# Patient Record
Sex: FEMALE | Race: WHITE | NOT HISPANIC OR LATINO | Employment: FULL TIME | URBAN - METROPOLITAN AREA
[De-identification: names, ages, dates, MRNs, and addresses within clinical notes are randomized per-mention and may not be internally consistent; named-entity substitution may affect disease eponyms.]

---

## 2017-05-23 ENCOUNTER — OFFICE VISIT (OUTPATIENT)
Dept: FAMILY MEDICINE | Facility: CLINIC | Age: 57
End: 2017-05-23
Payer: COMMERCIAL

## 2017-05-23 VITALS
HEIGHT: 61 IN | TEMPERATURE: 98 F | RESPIRATION RATE: 16 BRPM | WEIGHT: 110.44 LBS | BODY MASS INDEX: 20.85 KG/M2 | OXYGEN SATURATION: 97 % | DIASTOLIC BLOOD PRESSURE: 94 MMHG | HEART RATE: 75 BPM | SYSTOLIC BLOOD PRESSURE: 128 MMHG

## 2017-05-23 DIAGNOSIS — M81.0 OSTEOPOROSIS, UNSPECIFIED OSTEOPOROSIS TYPE, UNSPECIFIED PATHOLOGICAL FRACTURE PRESENCE: ICD-10-CM

## 2017-05-23 DIAGNOSIS — I10 HYPERTENSION, UNCONTROLLED: Primary | ICD-10-CM

## 2017-05-23 DIAGNOSIS — Z86.69 H/O MIGRAINE: ICD-10-CM

## 2017-05-23 DIAGNOSIS — Z85.3 HISTORY OF BREAST CANCER: ICD-10-CM

## 2017-05-23 PROCEDURE — 1160F RVW MEDS BY RX/DR IN RCRD: CPT | Mod: S$GLB,,, | Performed by: FAMILY MEDICINE

## 2017-05-23 PROCEDURE — 99214 OFFICE O/P EST MOD 30 MIN: CPT | Mod: S$GLB,,, | Performed by: FAMILY MEDICINE

## 2017-05-23 PROCEDURE — 3080F DIAST BP >= 90 MM HG: CPT | Mod: S$GLB,,, | Performed by: FAMILY MEDICINE

## 2017-05-23 PROCEDURE — 3074F SYST BP LT 130 MM HG: CPT | Mod: S$GLB,,, | Performed by: FAMILY MEDICINE

## 2017-05-23 PROCEDURE — 99999 PR PBB SHADOW E&M-NEW PATIENT-LVL III: CPT | Mod: PBBFAC,,, | Performed by: FAMILY MEDICINE

## 2017-05-23 RX ORDER — SUMATRIPTAN 50 MG/1
50 TABLET, FILM COATED ORAL
COMMUNITY

## 2017-05-23 RX ORDER — IBANDRONATE SODIUM 150 MG/1
150 TABLET, FILM COATED ORAL
Refills: 10 | COMMUNITY
Start: 2017-03-20 | End: 2018-04-26 | Stop reason: SDUPTHER

## 2017-05-23 RX ORDER — MAGNESIUM 200 MG
TABLET ORAL 2 TIMES DAILY
COMMUNITY
End: 2017-07-28

## 2017-05-23 RX ORDER — LISINOPRIL 20 MG/1
20 TABLET ORAL DAILY
Refills: 0 | COMMUNITY
Start: 2017-03-20 | End: 2017-05-23

## 2017-05-23 RX ORDER — AMLODIPINE BESYLATE 5 MG/1
5 TABLET ORAL DAILY
Qty: 90 TABLET | Refills: 2 | Status: SHIPPED | OUTPATIENT
Start: 2017-05-23 | End: 2017-07-28

## 2017-05-23 RX ORDER — HYDROCHLOROTHIAZIDE 25 MG/1
50 TABLET ORAL DAILY
Refills: 0 | COMMUNITY
Start: 2017-03-20 | End: 2017-05-23

## 2017-05-23 NOTE — PROGRESS NOTES
"Subjective:       Patient ID: Kandace Mcdonald is a 57 y.o. female.    Chief Complaint: Hypertension (follow up )    HPI    Htn, uncontrolled - Pt with a h/o elevated blood pressure. She was placed on lisinopril (which gave her a cough and hctz which "made her arms feel heavy" so she is not currently on any medications for this. Her job requires adequate BP control.     Osteoporosis - Chronic - pt diagnosed in the last 5 years, and is on boniva. She is also participating in strength training exercise at least BID to help strengthen her bones.     H/o breast cancer - pt was treated with surgery and radiation, and just finished 5 years of estrogen receptor blocker therapy. She has missed her last follow -up with H[place]. She is askign if she should reschedule that follow up appointment.     H/o migraines - pt is on imitrex prn.     No current outpatient prescriptions on file prior to visit.     No current facility-administered medications on file prior to visit.        Past Medical History:   Diagnosis Date    Allergy     Breast cancer     Cancer     breast    Hypertension     Invasive ductal carcinoma of breast     Osteoporosis        Family History   Problem Relation Age of Onset    Heart disease Mother     COPD Mother     Osteoporosis Mother     Heart disease Father     No Known Problems Sister     Hypothyroidism Daughter     No Known Problems Son     No Known Problems Maternal Grandmother     No Known Problems Maternal Grandfather     No Known Problems Paternal Grandmother     No Known Problems Paternal Grandfather     Breast cancer Sister         reports that she has never smoked. She does not have any smokeless tobacco history on file. She reports that she drinks alcohol. She reports that she does not use drugs.    Review of Systems   Constitutional: Negative for chills and fever.   HENT: Negative for congestion, ear pain, hearing loss, rhinorrhea and sore throat.    Eyes: Negative for pain " and discharge.   Respiratory: Negative for cough and shortness of breath.    Cardiovascular: Negative for chest pain and palpitations.   Gastrointestinal: Negative for diarrhea, nausea and vomiting.   Genitourinary: Negative for difficulty urinating, dysuria and frequency.   Allergic/Immunologic: Negative for environmental allergies and food allergies.   Neurological: Negative for seizures and weakness.   Psychiatric/Behavioral: Negative for dysphoric mood. The patient is not nervous/anxious.        Objective:     Vitals:    05/23/17 1603   BP: (!) 128/94   Pulse: 75   Resp: 16   Temp: 98.4 °F (36.9 °C)        Physical Exam   Constitutional: She appears well-developed. No distress.   HENT:   Head: Normocephalic and atraumatic.   Eyes: Conjunctivae are normal. No scleral icterus.   Pulmonary/Chest: Effort normal.   Musculoskeletal: She exhibits no deformity.   Neurological: She is alert.   Skin: Skin is warm and dry. No rash noted. She is not diaphoretic.   Psychiatric: She has a normal mood and affect. Her behavior is normal.   Vitals reviewed.      Assessment:       1. Hypertension, uncontrolled    2. Osteoporosis, unspecified osteoporosis type, unspecified pathological fracture presence    3. History of breast cancer    4. H/O migraine        Plan:       Kandace was seen today for hypertension.    Diagnoses and all orders for this visit:    Hypertension, uncontrolled  -     amlodipine (NORVASC) 5 MG tablet; Take 1 tablet (5 mg total) by mouth once daily.  Pts blood pressure is uncontrolled. I advised the following lifestyle changes:  - exercise for 20 mins x 3-5 a week per AHA recommendations  - weight reduction if overweight by calorie restriction  - increase consumption of fruit/vegetables to 5 or more servings a day        - reduce sodium intake    At this stage, we discussed that their risk for MI and CVA is too high with their current BP, and will add amlo 5mg.          Pt asked to keep BP log with date/BP,  taking BP at least 4 x a week. They will bring this with them to their next appointment.     Pt asked to call me if BPs consistently above 140/90.    Pts questions were answered.    The CVD Risk score (CHRISTY'Agostino, et al., 2008) failed to calculate for the following reasons:    Cannot find a previous HDL lab    Cannot find a previous total cholesterol lab    Osteoporosis, unspecified osteoporosis type, unspecified pathological fracture presence  - Chronic - stable     Pt is doing well on current therapy and is requesting a refill. No side effects noted. Will continue current therapy.         History of breast cancer  Pt asked to f/u with h/o and ask if routine observation can be taken over by primary care.     H/O migraine  - Chronic - stable     Pt is doing well on current therapy. No side effects noted. Will continue current therapy.            Return in about 4 weeks (around 6/20/2017) for Hypertension.      Pt verbalized understanding and agreed with our plan.

## 2017-05-23 NOTE — MEDICAL/APP STUDENT
Subjective:       Patient ID: Kandace Mcdonald is a 57 y.o. female.    Chief Complaint: Hypertension (follow up )    HPI     Referred to establish care with new PCP. 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     1. HTN - pt reports high BP found at recent physical for new job. She says she has never had HTN before. She was started on HCTZ and Lisinopril but stopped taking after 3 days due to muscle aches and lethargy.    2. Osteoperosis - existing problem from previous provider in [place]. She explained her last lab results showed signs of osteoperosis and was placed on Boniva. No associated muscle or bone pain.      Review of Systems   Constitutional: Negative for chills, fatigue and fever.   HENT: Negative for congestion, ear pain, rhinorrhea and sore throat.    Eyes: Negative.    Respiratory: Negative for cough and shortness of breath.    Cardiovascular: Negative for chest pain.   Gastrointestinal: Negative for abdominal pain, constipation, diarrhea, nausea and vomiting.   Genitourinary: Negative for dysuria, frequency and hematuria.   Musculoskeletal: Negative for arthralgias, myalgias and neck stiffness.   Neurological: Negative.    Psychiatric/Behavioral: Negative.        Objective:      Physical Exam   Constitutional: She is oriented to person, place, and time. She appears well-developed and well-nourished.   HENT:   Head: Normocephalic and atraumatic.   Eyes: EOM are normal. Pupils are equal, round, and reactive to light.   Neck: Normal range of motion. Neck supple.   Cardiovascular: Normal rate, regular rhythm, normal heart sounds and intact distal pulses.    Pulmonary/Chest: Effort normal and breath sounds normal.   Abdominal: Soft. Bowel sounds are normal.   Musculoskeletal: Normal range of motion.   Neurological: She is alert and oriented to person, place, and time.   Psychiatric: She has a normal mood and affect. Her behavior is normal. Judgment and thought content normal.        Assessment:       No diagnosis found.    Plan:       1. HTN - started on 5mg amlodipine. Follow- up 1 month

## 2017-06-23 ENCOUNTER — OFFICE VISIT (OUTPATIENT)
Dept: FAMILY MEDICINE | Facility: CLINIC | Age: 57
End: 2017-06-23
Payer: COMMERCIAL

## 2017-06-23 VITALS
BODY MASS INDEX: 20.65 KG/M2 | SYSTOLIC BLOOD PRESSURE: 110 MMHG | OXYGEN SATURATION: 97 % | TEMPERATURE: 98 F | HEART RATE: 78 BPM | DIASTOLIC BLOOD PRESSURE: 80 MMHG | HEIGHT: 61 IN | RESPIRATION RATE: 16 BRPM | WEIGHT: 109.38 LBS

## 2017-06-23 DIAGNOSIS — I10 HYPERTENSION, UNCONTROLLED: Primary | ICD-10-CM

## 2017-06-23 DIAGNOSIS — Z13.220 SCREENING FOR CHOLESTEROL LEVEL: ICD-10-CM

## 2017-06-23 DIAGNOSIS — E03.8 SUBCLINICAL HYPOTHYROIDISM: ICD-10-CM

## 2017-06-23 PROCEDURE — 99999 PR PBB SHADOW E&M-EST. PATIENT-LVL III: CPT | Mod: PBBFAC,,, | Performed by: FAMILY MEDICINE

## 2017-06-23 PROCEDURE — 99214 OFFICE O/P EST MOD 30 MIN: CPT | Mod: S$GLB,,, | Performed by: FAMILY MEDICINE

## 2017-06-23 NOTE — PROGRESS NOTES
Subjective:       Patient ID: Kandace Mcdonald is a 57 y.o. female.    Chief Complaint: Hypertension (follow up )    HPI    HTN F/u:    Adherence with meds? Yes  Home BP range: 106-139/ Majority around 120/85  Side effects: No  Following a low salt diet ? Yes  Current exercise? Yes  Need of refills? No  Recent changes in blood pressure medication: Yes added amlo 5mg  Patient has been in pain or taking decongestants/anti-inflammatory/OCP/SSRI medication: no  Patient has other symptoms (headache, weakness,  blurry vision, chest pain, palpitations, etc): No    Sublcincial hypothyroid - pts was diagnosed with low thyroid 1+ year ago, but was not placed on medication, but was instead asked to start a supplement for the thyroid called '[place] thyroid'. She takes this every night and states that his helps her sleep.     HLD - pt states that her bp is normally 'borderline'    Current Outpatient Prescriptions on File Prior to Visit   Medication Sig Dispense Refill    amlodipine (NORVASC) 5 MG tablet Take 1 tablet (5 mg total) by mouth once daily. 90 tablet 2    CHOLECALCIFEROL, VITAMIN D3, (D3-2000 ORAL) Take by mouth 2 (two) times daily.      GLUCOSAMINE SULFATE (GLUCOSAMINE ORAL) Take by mouth 2 (two) times daily.      ibandronate (BONIVA) 150 mg tablet Take 150 mg by mouth every 30 days.  10    magnesium 200 mg Tab Take by mouth 2 (two) times daily.      MULTIVITAMIN ORAL Take by mouth once daily at 6am.      sumatriptan (IMITREX) 50 MG tablet Take 50 mg by mouth as needed for Migraine.      THYROID ORAL Take by mouth once daily at 6am.       No current facility-administered medications on file prior to visit.        Past Medical History:   Diagnosis Date    Allergy     Breast cancer     Cancer     breast    Hypertension     Invasive ductal carcinoma of breast     Osteoporosis        Family History   Problem Relation Age of Onset    Heart disease Mother     COPD Mother     Osteoporosis Mother      Heart disease Father     No Known Problems Sister     Hypothyroidism Daughter     No Known Problems Son     No Known Problems Maternal Grandmother     No Known Problems Maternal Grandfather     No Known Problems Paternal Grandmother     No Known Problems Paternal Grandfather     Breast cancer Sister         reports that she has never smoked. She does not have any smokeless tobacco history on file. She reports that she drinks alcohol. She reports that she does not use drugs.    Review of Systems  see hpi  Objective:     Vitals:    06/23/17 1610   BP: 110/80   Pulse: 78   Resp: 16   Temp: 98.4 °F (36.9 °C)        Physical Exam   Constitutional: She appears well-developed. No distress.   HENT:   Head: Normocephalic and atraumatic.   Eyes: Conjunctivae are normal. Right eye exhibits no discharge. Left eye exhibits no discharge. No scleral icterus.   Cardiovascular: Normal rate, regular rhythm and normal heart sounds.  Exam reveals no gallop and no friction rub.    No murmur heard.  Pulmonary/Chest: Effort normal and breath sounds normal. No respiratory distress. She has no wheezes. She has no rales.   Musculoskeletal: She exhibits no edema.   Neurological: She is alert.   Skin: She is not diaphoretic.   Psychiatric: She has a normal mood and affect.   Vitals reviewed.      Assessment:       1. Hypertension, uncontrolled    2. Subclinical hypothyroidism    3. Screening for cholesterol level        Plan:       Kandace was seen today for hypertension.    Diagnoses and all orders for this visit:    Hypertension, uncontrolled  Pts blood pressure is uncontrolled.          Pt asked to keep BP log with date/BP, taking BP at least 4 x a week. They will bring this with them to their next appointment.     Pt asked to call me if BPs consistently above 140/90.     Pts questions were answered.    The CVD Risk score (D'Agostino, et al., 2008) failed to calculate for the following reasons:    Cannot find a previous HDL lab     Cannot find a previous total cholesterol lab      Subclinical hypothyroidism - NEW  -     TSH; Future  -     Comprehensive metabolic panel; Future  Last tsh was > 4  Will check again.   I am uncertain what her thyroid supplement contains. Will reval at the next visit after labs are resulted.       Screening for cholesterol level  -     Lipid panel; Future            Return in about 4 weeks (around 7/21/2017) for Hypertension.        Pt verbalized understanding and agreed with our plan.

## 2017-07-22 ENCOUNTER — LAB VISIT (OUTPATIENT)
Dept: LAB | Facility: HOSPITAL | Age: 57
End: 2017-07-22
Attending: FAMILY MEDICINE
Payer: COMMERCIAL

## 2017-07-22 DIAGNOSIS — Z13.220 SCREENING FOR CHOLESTEROL LEVEL: ICD-10-CM

## 2017-07-22 DIAGNOSIS — E03.8 SUBCLINICAL HYPOTHYROIDISM: ICD-10-CM

## 2017-07-22 LAB
ALBUMIN SERPL BCP-MCNC: 4 G/DL
ALP SERPL-CCNC: 89 U/L
ALT SERPL W/O P-5'-P-CCNC: 20 U/L
ANION GAP SERPL CALC-SCNC: 9 MMOL/L
AST SERPL-CCNC: 22 U/L
BILIRUB SERPL-MCNC: 1.1 MG/DL
BUN SERPL-MCNC: 17 MG/DL
CALCIUM SERPL-MCNC: 9.9 MG/DL
CHLORIDE SERPL-SCNC: 107 MMOL/L
CHOLEST/HDLC SERPL: 4.5 {RATIO}
CO2 SERPL-SCNC: 24 MMOL/L
CREAT SERPL-MCNC: 0.9 MG/DL
EST. GFR  (AFRICAN AMERICAN): >60 ML/MIN/1.73 M^2
EST. GFR  (NON AFRICAN AMERICAN): >60 ML/MIN/1.73 M^2
GLUCOSE SERPL-MCNC: 91 MG/DL
HDL/CHOLESTEROL RATIO: 22.2 %
HDLC SERPL-MCNC: 243 MG/DL
HDLC SERPL-MCNC: 54 MG/DL
LDLC SERPL CALC-MCNC: 164.8 MG/DL
NONHDLC SERPL-MCNC: 189 MG/DL
POTASSIUM SERPL-SCNC: 4.3 MMOL/L
PROT SERPL-MCNC: 7.3 G/DL
SODIUM SERPL-SCNC: 140 MMOL/L
TRIGL SERPL-MCNC: 121 MG/DL
TSH SERPL DL<=0.005 MIU/L-ACNC: 3.42 UIU/ML

## 2017-07-22 PROCEDURE — 80061 LIPID PANEL: CPT

## 2017-07-22 PROCEDURE — 36415 COLL VENOUS BLD VENIPUNCTURE: CPT | Mod: PO

## 2017-07-22 PROCEDURE — 80053 COMPREHEN METABOLIC PANEL: CPT

## 2017-07-22 PROCEDURE — 84443 ASSAY THYROID STIM HORMONE: CPT

## 2017-07-28 ENCOUNTER — OFFICE VISIT (OUTPATIENT)
Dept: FAMILY MEDICINE | Facility: CLINIC | Age: 57
End: 2017-07-28
Payer: COMMERCIAL

## 2017-07-28 VITALS
HEIGHT: 61 IN | WEIGHT: 109.38 LBS | HEART RATE: 71 BPM | SYSTOLIC BLOOD PRESSURE: 118 MMHG | OXYGEN SATURATION: 97 % | BODY MASS INDEX: 20.65 KG/M2 | TEMPERATURE: 98 F | DIASTOLIC BLOOD PRESSURE: 80 MMHG

## 2017-07-28 DIAGNOSIS — R10.9 FUNCTIONAL ABDOMINAL PAIN SYNDROME: ICD-10-CM

## 2017-07-28 DIAGNOSIS — I10 HYPERTENSION, UNCONTROLLED: Primary | ICD-10-CM

## 2017-07-28 PROCEDURE — 99214 OFFICE O/P EST MOD 30 MIN: CPT | Mod: S$GLB,,, | Performed by: FAMILY MEDICINE

## 2017-07-28 PROCEDURE — 99999 PR PBB SHADOW E&M-EST. PATIENT-LVL III: CPT | Mod: PBBFAC,,, | Performed by: FAMILY MEDICINE

## 2017-07-28 RX ORDER — AMLODIPINE BESYLATE 10 MG/1
10 TABLET ORAL DAILY
Qty: 90 TABLET | Refills: 2 | Status: SHIPPED | OUTPATIENT
Start: 2017-07-28 | End: 2017-10-27

## 2017-07-28 NOTE — PROGRESS NOTES
Subjective:       Patient ID: Kandace Mcdonald is a 57 y.o. female.    Chief Complaint: 1 Month follow up/ Results    HPI    HTN F/u:    Adherence with meds? No  Home BP range: running a little high - 90s diastolic.   Side effects: No  Following a low salt diet ? No  Current exercise? Yes  Need of refills? No  Recent changes in blood pressure medication: No  Patient has been in pain or taking decongestants/anti-inflammatory/OCP/SSRI medication: no  Patient has other symptoms (headache, weakness,  blurry vision, chest pain, palpitations, etc): no    Upset stomach (mild abd pain) x 3 weeks that occurs mostly to the morning, but has occurred during the afternoon after she eats. Her biggest meal is at night and she does not eat breakfast often. Her stools lately have been loose, and her bms are the morning, and her pain goes away after she has a bowel movement.     + extra stress lately.         Current Outpatient Prescriptions on File Prior to Visit   Medication Sig Dispense Refill    CHOLECALCIFEROL, VITAMIN D3, (D3-2000 ORAL) Take by mouth 2 (two) times daily.      GLUCOSAMINE SULFATE (GLUCOSAMINE ORAL) Take by mouth 2 (two) times daily.      ibandronate (BONIVA) 150 mg tablet Take 150 mg by mouth every 30 days.  10    MULTIVITAMIN ORAL Take by mouth once daily at 6am.      sumatriptan (IMITREX) 50 MG tablet Take 50 mg by mouth as needed for Migraine.      THYROID ORAL Take by mouth once daily at 6am.      [DISCONTINUED] amlodipine (NORVASC) 5 MG tablet Take 1 tablet (5 mg total) by mouth once daily. 90 tablet 2    [DISCONTINUED] magnesium 200 mg Tab Take by mouth 2 (two) times daily.       No current facility-administered medications on file prior to visit.        Past Medical History:   Diagnosis Date    Allergy     Breast cancer     Cancer     breast    Hypertension     Invasive ductal carcinoma of breast     Osteoporosis        Family History   Problem Relation Age of Onset    Heart disease Mother      COPD Mother     Osteoporosis Mother     Heart disease Father     No Known Problems Sister     Hypothyroidism Daughter     No Known Problems Son     No Known Problems Maternal Grandmother     No Known Problems Maternal Grandfather     No Known Problems Paternal Grandmother     No Known Problems Paternal Grandfather     Breast cancer Sister         reports that she has never smoked. She does not have any smokeless tobacco history on file. She reports that she drinks alcohol. She reports that she does not use drugs.    Review of Systems  see hpi  Objective:     Vitals:    07/28/17 1554   BP: 118/80   Pulse: 71   Temp: 98.2 °F (36.8 °C)        Physical Exam   Constitutional: She appears well-developed. No distress.   HENT:   Head: Normocephalic and atraumatic.   Eyes: Conjunctivae are normal. Right eye exhibits no discharge. Left eye exhibits no discharge. No scleral icterus.   Cardiovascular: Normal rate, regular rhythm and normal heart sounds.  Exam reveals no gallop and no friction rub.    No murmur heard.  Pulmonary/Chest: Effort normal and breath sounds normal. No respiratory distress. She has no wheezes. She has no rales.   Abdominal: Soft. Bowel sounds are normal. She exhibits no distension. There is no tenderness. There is no guarding.   Neurological: She is alert.   Skin: She is not diaphoretic.   Psychiatric: She has a normal mood and affect.   Vitals reviewed.      Assessment:       1. Hypertension, uncontrolled    2. Functional abdominal pain syndrome        Plan:       Kandace was seen today for 1 month follow up/ results.    Diagnoses and all orders for this visit:    Hypertension, uncontrolled  -     amlodipine (NORVASC) 10 MG tablet; Take 1 tablet (10 mg total) by mouth once daily.  suboptimal control --> amlo 10.  F/u in 3 months or sooner if still above goal of 140/90.     Functional abdominal pain syndrome  Diff includes FAP vs bacterial overgrowth. Will try Align.           Return in  about 3 months (around 10/28/2017) for Hypertension, abd pain.        Pt verbalized understanding and agreed with our plan.

## 2017-08-02 ENCOUNTER — TELEPHONE (OUTPATIENT)
Dept: FAMILY MEDICINE | Facility: CLINIC | Age: 57
End: 2017-08-02

## 2017-08-02 NOTE — TELEPHONE ENCOUNTER
----- Message from Sandeep Grady MD sent at 7/25/2017  2:10 PM CDT -----  Results were all OK except for you cholesterol, which was high, but not high enough to require medication.  Please work to minimize your fried/junk food intake, and start exercising 3 x per week with a goal of 5 days per week for at least 30 minutes if you are not doing so already. Let me know if you have questions.

## 2017-08-10 ENCOUNTER — TELEPHONE (OUTPATIENT)
Dept: FAMILY MEDICINE | Facility: CLINIC | Age: 57
End: 2017-08-10

## 2017-08-10 NOTE — TELEPHONE ENCOUNTER
----- Message from Kasie Cortes sent at 8/9/2017  4:07 PM CDT -----  Contact: SELF  Patient states ---amlodipine (NORVASC) 10 MG tablet is causing her ankles to swell .  [phone]   LL

## 2017-08-11 NOTE — TELEPHONE ENCOUNTER
----- Message from Shara Macias sent at 8/11/2017  1:42 PM CDT -----  Contact: [phone] please after 4   Pt is returning the phone call Please call pt at your earliest convenience.  Thanks!

## 2017-10-27 ENCOUNTER — OFFICE VISIT (OUTPATIENT)
Dept: FAMILY MEDICINE | Facility: CLINIC | Age: 57
End: 2017-10-27
Payer: COMMERCIAL

## 2017-10-27 VITALS
HEIGHT: 61 IN | DIASTOLIC BLOOD PRESSURE: 80 MMHG | OXYGEN SATURATION: 97 % | TEMPERATURE: 99 F | HEART RATE: 76 BPM | WEIGHT: 110 LBS | BODY MASS INDEX: 20.77 KG/M2 | SYSTOLIC BLOOD PRESSURE: 120 MMHG | RESPIRATION RATE: 16 BRPM

## 2017-10-27 DIAGNOSIS — T50.905A ADVERSE EFFECT OF DRUG, INITIAL ENCOUNTER: ICD-10-CM

## 2017-10-27 DIAGNOSIS — I10 HYPERTENSION, WELL CONTROLLED: Primary | ICD-10-CM

## 2017-10-27 PROCEDURE — 99999 PR PBB SHADOW E&M-EST. PATIENT-LVL III: CPT | Mod: PBBFAC,,, | Performed by: FAMILY MEDICINE

## 2017-10-27 PROCEDURE — 99214 OFFICE O/P EST MOD 30 MIN: CPT | Mod: S$GLB,,, | Performed by: FAMILY MEDICINE

## 2017-10-27 RX ORDER — AMLODIPINE BESYLATE 5 MG/1
5 TABLET ORAL DAILY
Qty: 90 TABLET | Refills: 3 | Status: SHIPPED | OUTPATIENT
Start: 2017-10-27 | End: 2018-04-26

## 2017-10-27 RX ORDER — HYDROCHLOROTHIAZIDE 12.5 MG/1
12.5 TABLET ORAL DAILY
Qty: 90 TABLET | Refills: 3 | Status: SHIPPED | OUTPATIENT
Start: 2017-10-27 | End: 2018-04-26

## 2017-10-27 NOTE — PROGRESS NOTES
Hepatitis C Screening Consult with PCP            Colonoscopy  pt will call when ready     Influenza Vaccine Done with work; pt will drop off records at next visit.

## 2017-10-27 NOTE — PROGRESS NOTES
Subjective:       Patient ID: Kandace Mcdonald is a 57 y.o. female.    Chief Complaint: Abdominal Pain (follow up ); Hypertension (follow up ); and Medication Reaction (ankle swelling )    HPI    HTN F/u:    Adherence with meds? Yes  Home BP range: 112-125/70-86  Side effects: Yes - ankle swelling - worsened with 10mg dose.   Following a low salt diet ? Yes most of the time.   Current exercise? Yes - yoga and strength training.   Need of refills? no  Recent changes in blood pressure medication: No  Patient has been in pain or taking decongestants/anti-inflammatory/OCP/SSRI medication: no  Patient has other symptoms (headache, weakness,  blurry vision, chest pain, palpitations, etc): yes       Abd pain - resolved!          Current Outpatient Prescriptions on File Prior to Visit   Medication Sig Dispense Refill    CHOLECALCIFEROL, VITAMIN D3, (D3-2000 ORAL) Take by mouth once daily.       ibandronate (BONIVA) 150 mg tablet Take 150 mg by mouth every 30 days.  10    MULTIVITAMIN ORAL Take by mouth once daily at 6am.      sumatriptan (IMITREX) 50 MG tablet Take 50 mg by mouth as needed for Migraine.      THYROID ORAL Take by mouth once daily at 6am.      [DISCONTINUED] amlodipine (NORVASC) 10 MG tablet Take 1 tablet (10 mg total) by mouth once daily. 90 tablet 2    GLUCOSAMINE SULFATE (GLUCOSAMINE ORAL) Take by mouth 2 (two) times daily.       No current facility-administered medications on file prior to visit.        Past Medical History:   Diagnosis Date    Allergy     Breast cancer     Cancer     breast    Hypertension     Invasive ductal carcinoma of breast     Osteoporosis        Family History   Problem Relation Age of Onset    Heart disease Mother     COPD Mother     Osteoporosis Mother     Heart disease Father     No Known Problems Sister     Hypothyroidism Daughter     No Known Problems Son     No Known Problems Maternal Grandmother     No Known Problems Maternal Grandfather     No Known  Problems Paternal Grandmother     No Known Problems Paternal Grandfather     Breast cancer Sister         reports that she has never smoked. She has never used smokeless tobacco. She reports that she drinks alcohol. She reports that she does not use drugs.    Review of Systems   See hpi  Objective:     Vitals:    10/27/17 1545   BP: 120/80   Pulse: 76   Resp: 16   Temp: 98.6 °F (37 °C)        Physical Exam   Constitutional: She appears well-developed. No distress.   HENT:   Head: Normocephalic and atraumatic.   Eyes: Conjunctivae are normal. No scleral icterus.   Pulmonary/Chest: Effort normal.   Neurological: She is alert.   Skin: She is not diaphoretic.   Psychiatric: She has a normal mood and affect. Her behavior is normal.   Vitals reviewed.      Assessment:       1. Hypertension, well controlled    2. Adverse effect of drug, initial encounter        Plan:       Kandace was seen today for abdominal pain, hypertension and medication reaction.    Diagnoses and all orders for this visit:    Hypertension, well controlled  -     amLODIPine (NORVASC) 5 MG tablet; Take 1 tablet (5 mg total) by mouth once daily.  -     hydroCHLOROthiazide (HYDRODIURIL) 12.5 MG Tab; Take 1 tablet (12.5 mg total) by mouth once daily.  We will cut amlodipine down to 5 mg and add hydrochlorothiazide 12.5 mg. Patient to continue checking her blood pressure daily since we are changing her medications. She is to notify me if she has any side effects or issues with the medication, or if the medication regimen does not control her blood pressure adequately.    Adverse effect of drug, initial encounter  Patient with you edema with 10 mg of amlodipine. Patient is stressed by this so will change medications as above.        Return in about 6 months (around 4/27/2018).      Pt verbalized understanding and agreed with our plan.

## 2018-01-04 ENCOUNTER — PATIENT MESSAGE (OUTPATIENT)
Dept: FAMILY MEDICINE | Facility: CLINIC | Age: 58
End: 2018-01-04

## 2018-01-04 DIAGNOSIS — Z12.39 BREAST CANCER SCREENING: Primary | ICD-10-CM

## 2018-01-05 NOTE — TELEPHONE ENCOUNTER
Please see pt message. Not sure if she needs screening or diagnostic mammogram since she has history of breast cancer

## 2018-01-09 ENCOUNTER — PATIENT MESSAGE (OUTPATIENT)
Dept: FAMILY MEDICINE | Facility: CLINIC | Age: 58
End: 2018-01-09

## 2018-01-09 DIAGNOSIS — L85.9 HYPERKERATOSIS: Primary | ICD-10-CM

## 2018-01-11 ENCOUNTER — PATIENT MESSAGE (OUTPATIENT)
Dept: FAMILY MEDICINE | Facility: CLINIC | Age: 58
End: 2018-01-11

## 2018-01-23 ENCOUNTER — PATIENT MESSAGE (OUTPATIENT)
Dept: FAMILY MEDICINE | Facility: CLINIC | Age: 58
End: 2018-01-23

## 2018-01-23 NOTE — TELEPHONE ENCOUNTER
Could we please call her and ask for her to come in to discuss her hair loss patterns and her options for an alternative?    Thanks

## 2018-01-30 ENCOUNTER — HOSPITAL ENCOUNTER (OUTPATIENT)
Dept: RADIOLOGY | Facility: HOSPITAL | Age: 58
Discharge: HOME | End: 2018-01-30
Attending: FAMILY MEDICINE
Payer: COMMERCIAL

## 2018-01-30 DIAGNOSIS — Z12.39 BREAST CANCER SCREENING: ICD-10-CM

## 2018-01-30 PROCEDURE — 77067 SCR MAMMO BI INCL CAD: CPT | Mod: 26,,, | Performed by: RADIOLOGY

## 2018-01-30 PROCEDURE — 77063 BREAST TOMOSYNTHESIS BI: CPT | Mod: 26,,, | Performed by: RADIOLOGY

## 2018-01-30 PROCEDURE — 77067 SCR MAMMO BI INCL CAD: CPT | Mod: TC

## 2018-02-02 ENCOUNTER — OFFICE VISIT (OUTPATIENT)
Dept: FAMILY MEDICINE | Facility: CLINIC | Age: 58
End: 2018-02-02
Payer: COMMERCIAL

## 2018-02-02 VITALS
DIASTOLIC BLOOD PRESSURE: 86 MMHG | HEART RATE: 74 BPM | RESPIRATION RATE: 16 BRPM | OXYGEN SATURATION: 99 % | WEIGHT: 110.44 LBS | TEMPERATURE: 99 F | SYSTOLIC BLOOD PRESSURE: 154 MMHG | BODY MASS INDEX: 20.85 KG/M2 | HEIGHT: 61 IN

## 2018-02-02 DIAGNOSIS — T50.905A ADVERSE EFFECT OF DRUG, INITIAL ENCOUNTER: ICD-10-CM

## 2018-02-02 DIAGNOSIS — I10 HYPERTENSION, UNCONTROLLED: Primary | ICD-10-CM

## 2018-02-02 DIAGNOSIS — M81.8 OTHER OSTEOPOROSIS WITHOUT CURRENT PATHOLOGICAL FRACTURE: ICD-10-CM

## 2018-02-02 DIAGNOSIS — L65.9 ALOPECIA: ICD-10-CM

## 2018-02-02 PROCEDURE — 99214 OFFICE O/P EST MOD 30 MIN: CPT | Mod: S$GLB,,, | Performed by: FAMILY MEDICINE

## 2018-02-02 PROCEDURE — 99999 PR PBB SHADOW E&M-EST. PATIENT-LVL IV: CPT | Mod: PBBFAC,,, | Performed by: FAMILY MEDICINE

## 2018-02-02 PROCEDURE — 3008F BODY MASS INDEX DOCD: CPT | Mod: S$GLB,,, | Performed by: FAMILY MEDICINE

## 2018-02-02 RX ORDER — OLMESARTAN MEDOXOMIL 40 MG/1
40 TABLET ORAL DAILY
Qty: 90 TABLET | Refills: 3 | Status: SHIPPED | OUTPATIENT
Start: 2018-02-02 | End: 2018-04-26 | Stop reason: SDUPTHER

## 2018-02-02 NOTE — PROGRESS NOTES
Subjective:       Patient ID: Kandace Mcdonald is a 57 y.o. female.    Chief Complaint: Medication Reaction (hctz and amplodipine; cause hair loss and dry mouth )    HPI    Htn - pt states that she had a side effect with both hctz and amlodipine - hair loss that is diffuse and dry mouth. These did control her blood pressure well however.     HLD - pt continues to have high cholesterol.     Osteoporosis - pt has been on boniva for 2 years. Her osteoporosis is fem neck was =-2.5.    +h/o estrogen rec inhibitor for breast cancer.   Current Outpatient Prescriptions on File Prior to Visit   Medication Sig Dispense Refill    CHOLECALCIFEROL, VITAMIN D3, (D3-2000 ORAL) Take by mouth once daily.       ibandronate (BONIVA) 150 mg tablet Take 150 mg by mouth every 30 days.  10    MULTIVITAMIN ORAL Take by mouth once daily at 6am.      sumatriptan (IMITREX) 50 MG tablet Take 50 mg by mouth as needed for Migraine.      THYROID ORAL Take by mouth once daily at 6am.      [DISCONTINUED] GLUCOSAMINE SULFATE (GLUCOSAMINE ORAL) Take by mouth 2 (two) times daily.      amLODIPine (NORVASC) 5 MG tablet Take 1 tablet (5 mg total) by mouth once daily. 90 tablet 3    hydroCHLOROthiazide (HYDRODIURIL) 12.5 MG Tab Take 1 tablet (12.5 mg total) by mouth once daily. 90 tablet 3     No current facility-administered medications on file prior to visit.        Past Medical History:   Diagnosis Date    Allergy     Breast cancer 2009    left    Cancer     breast    Hypertension     Invasive ductal carcinoma of breast     Osteoporosis        Family History   Problem Relation Age of Onset    Heart disease Mother     COPD Mother     Osteoporosis Mother     Heart disease Father     No Known Problems Sister     Hypothyroidism Daughter     No Known Problems Son     No Known Problems Maternal Grandmother     No Known Problems Maternal Grandfather     Breast cancer Paternal Grandmother     No Known Problems Paternal Grandfather      Breast cancer Sister         reports that she has never smoked. She has never used smokeless tobacco. She reports that she drinks alcohol. She reports that she does not use drugs.    Review of Systems   Constitutional: Negative for activity change and unexpected weight change.   HENT: Negative for hearing loss, rhinorrhea and trouble swallowing.    Eyes: Negative for discharge and visual disturbance.   Respiratory: Negative for chest tightness and wheezing.    Cardiovascular: Negative for chest pain and palpitations.   Gastrointestinal: Negative for blood in stool, constipation, diarrhea and vomiting.   Endocrine: Negative for polydipsia and polyuria.   Genitourinary: Negative for difficulty urinating, dysuria, hematuria and menstrual problem.   Musculoskeletal: Negative for arthralgias, joint swelling and neck pain.   Neurological: Negative for weakness and headaches.   Psychiatric/Behavioral: Negative for confusion and dysphoric mood.       Objective:     Vitals:    02/02/18 0738   BP: (!) 154/86   Pulse: 74   Resp: 16   Temp: 98.5 °F (36.9 °C)        Physical Exam   Constitutional: She appears well-developed. No distress.   HENT:   Head: Normocephalic and atraumatic.   Eyes: Conjunctivae are normal. Right eye exhibits no discharge. Left eye exhibits no discharge. No scleral icterus.   Cardiovascular: Normal rate, regular rhythm and normal heart sounds.  Exam reveals no gallop and no friction rub.    No murmur heard.  Pulmonary/Chest: Effort normal and breath sounds normal. No respiratory distress. She has no wheezes. She has no rales.   Neurological: She is alert.   Skin: She is not diaphoretic.   Psychiatric: She has a normal mood and affect. Her behavior is normal.   Vitals reviewed.      Assessment:       1. Hypertension, uncontrolled    2. Other osteoporosis without current pathological fracture    3. Adverse effect of drug, initial encounter    4. Alopecia        Plan:       Kandace was seen today for  medication reaction.    Diagnoses and all orders for this visit:    Hypertension, uncontrolled  -     olmesartan (BENICAR) 40 MG tablet; Take 1 tablet (40 mg total) by mouth once daily.  Patient to start olmesartan today at 20 mg daily for one month. If her blood pressure still remains above 130/80 she is to increase to 40 mg per day if her blood pressure remains above 130/80 still she is to contact me for follow-up sooner then currently scheduled.    Other osteoporosis without current pathological fracture  -     DXA Bone Density Spine And Hip; Future    Adverse effect of drug, initial encounter  Patient states that amlodipine and hydrochlorothiazide caused her to side effects diffuse hair loss and dry mouth. We agreed to discontinue these today and to start olmesartan at 20 mg for one month.    Alopecia  Not noted on exam.           Follow-up in about 4 months (around 6/2/2018) for Annual Physical.        Pt verbalized understanding and agreed with our plan.

## 2018-02-19 ENCOUNTER — HOSPITAL ENCOUNTER (OUTPATIENT)
Dept: RADIOLOGY | Facility: HOSPITAL | Age: 58
Discharge: HOME | End: 2018-02-19
Attending: FAMILY MEDICINE
Payer: COMMERCIAL

## 2018-02-19 DIAGNOSIS — M81.8 OTHER OSTEOPOROSIS WITHOUT CURRENT PATHOLOGICAL FRACTURE: ICD-10-CM

## 2018-02-19 PROCEDURE — 77080 DXA BONE DENSITY AXIAL: CPT | Mod: TC

## 2018-02-19 PROCEDURE — 77080 DXA BONE DENSITY AXIAL: CPT | Mod: 26,,, | Performed by: RADIOLOGY

## 2018-02-20 ENCOUNTER — TELEPHONE (OUTPATIENT)
Dept: FAMILY MEDICINE | Facility: CLINIC | Age: 58
End: 2018-02-20

## 2018-02-20 NOTE — TELEPHONE ENCOUNTER
----- Message from Sandeep Grady MD sent at 2/19/2018  5:03 PM CST -----  Patient's result shows that she has some bone weakness and no osteoporosis. Weightbearing or resistance exercises can improve this as well Ensuring adequate intake of dietary calcium (1200 mg/d) and vitamin D (400-800 IU daily).

## 2018-02-20 NOTE — TELEPHONE ENCOUNTER
No answer. Busy tone. Will attempt to call at another time       Health Maintenance         Hepatitis C Screening

## 2018-02-27 ENCOUNTER — PATIENT MESSAGE (OUTPATIENT)
Dept: FAMILY MEDICINE | Facility: CLINIC | Age: 58
End: 2018-02-27

## 2018-02-28 ENCOUNTER — TELEPHONE (OUTPATIENT)
Dept: FAMILY MEDICINE | Facility: CLINIC | Age: 58
End: 2018-02-28

## 2018-02-28 NOTE — TELEPHONE ENCOUNTER
Due to DXA results, pt wants to know if you want her to continue to boniva or just do Vit D and calcium

## 2018-04-26 ENCOUNTER — LAB VISIT (OUTPATIENT)
Dept: LAB | Facility: HOSPITAL | Age: 58
End: 2018-04-26
Attending: FAMILY MEDICINE
Payer: COMMERCIAL

## 2018-04-26 ENCOUNTER — OFFICE VISIT (OUTPATIENT)
Dept: FAMILY MEDICINE | Facility: CLINIC | Age: 58
End: 2018-04-26
Payer: COMMERCIAL

## 2018-04-26 VITALS
BODY MASS INDEX: 20.65 KG/M2 | OXYGEN SATURATION: 99 % | RESPIRATION RATE: 16 BRPM | WEIGHT: 109.38 LBS | HEIGHT: 61 IN | DIASTOLIC BLOOD PRESSURE: 78 MMHG | SYSTOLIC BLOOD PRESSURE: 106 MMHG | TEMPERATURE: 98 F | HEART RATE: 70 BPM

## 2018-04-26 DIAGNOSIS — M85.80 OSTEOPENIA, UNSPECIFIED LOCATION: ICD-10-CM

## 2018-04-26 DIAGNOSIS — Z00.00 ANNUAL PHYSICAL EXAM: Primary | ICD-10-CM

## 2018-04-26 DIAGNOSIS — Z00.00 ANNUAL PHYSICAL EXAM: ICD-10-CM

## 2018-04-26 DIAGNOSIS — I10 HYPERTENSION, UNCONTROLLED: ICD-10-CM

## 2018-04-26 LAB
25(OH)D3+25(OH)D2 SERPL-MCNC: 69 NG/ML
BASOPHILS # BLD AUTO: 0.06 K/UL
BASOPHILS NFR BLD: 0.9 %
DIFFERENTIAL METHOD: NORMAL
EOSINOPHIL # BLD AUTO: 0.1 K/UL
EOSINOPHIL NFR BLD: 1.7 %
ERYTHROCYTE [DISTWIDTH] IN BLOOD BY AUTOMATED COUNT: 12.3 %
HCT VFR BLD AUTO: 40 %
HCV AB SERPL QL IA: NEGATIVE
HGB BLD-MCNC: 13.3 G/DL
IMM GRANULOCYTES # BLD AUTO: 0.02 K/UL
IMM GRANULOCYTES NFR BLD AUTO: 0.3 %
LYMPHOCYTES # BLD AUTO: 2.4 K/UL
LYMPHOCYTES NFR BLD: 37 %
MCH RBC QN AUTO: 30.8 PG
MCHC RBC AUTO-ENTMCNC: 33.3 G/DL
MCV RBC AUTO: 93 FL
MONOCYTES # BLD AUTO: 0.6 K/UL
MONOCYTES NFR BLD: 8.8 %
NEUTROPHILS # BLD AUTO: 3.4 K/UL
NEUTROPHILS NFR BLD: 51.3 %
NRBC BLD-RTO: 0 /100 WBC
PLATELET # BLD AUTO: 334 K/UL
PMV BLD AUTO: 10.1 FL
RBC # BLD AUTO: 4.32 M/UL
WBC # BLD AUTO: 6.6 K/UL

## 2018-04-26 PROCEDURE — 36415 COLL VENOUS BLD VENIPUNCTURE: CPT | Mod: PO

## 2018-04-26 PROCEDURE — 99999 PR PBB SHADOW E&M-EST. PATIENT-LVL IV: CPT | Mod: PBBFAC,,, | Performed by: FAMILY MEDICINE

## 2018-04-26 PROCEDURE — 85025 COMPLETE CBC W/AUTO DIFF WBC: CPT

## 2018-04-26 PROCEDURE — 99396 PREV VISIT EST AGE 40-64: CPT | Mod: S$GLB,,, | Performed by: FAMILY MEDICINE

## 2018-04-26 PROCEDURE — 82306 VITAMIN D 25 HYDROXY: CPT

## 2018-04-26 PROCEDURE — 3078F DIAST BP <80 MM HG: CPT | Mod: CPTII,S$GLB,, | Performed by: FAMILY MEDICINE

## 2018-04-26 PROCEDURE — 86803 HEPATITIS C AB TEST: CPT

## 2018-04-26 PROCEDURE — 3074F SYST BP LT 130 MM HG: CPT | Mod: CPTII,S$GLB,, | Performed by: FAMILY MEDICINE

## 2018-04-26 RX ORDER — IBANDRONATE SODIUM 150 MG/1
150 TABLET, FILM COATED ORAL
Qty: 3 TABLET | Refills: 4 | Status: SHIPPED | OUTPATIENT
Start: 2018-04-26

## 2018-04-26 RX ORDER — OLMESARTAN MEDOXOMIL 20 MG/1
20 TABLET ORAL DAILY
Qty: 90 TABLET | Refills: 3 | Status: SHIPPED | OUTPATIENT
Start: 2018-04-26 | End: 2018-07-05

## 2018-04-26 NOTE — PROGRESS NOTES
Subjective:       Patient ID: Kandace Mcdonald is a 58 y.o. female.    Chief Complaint: Annual Exam and Labs Only (Hepatitits C)    HPI    Annual Physical    Diet: 8/10    Exercise: 3 days of yoga one of resistance, no cardi    Immunizations required: shingrix - inquire at pharmacy    Cancer screenings required: UTD    Other screenings required: Hep C    Acute complaints today:      Refill of boniva for osteopenia     Outpatient Prescriptions Marked as Taking for the 4/26/18 encounter (Office Visit) with Sandeep Grady MD   Medication Sig Dispense Refill    CHOLECALCIFEROL, VITAMIN D3, (D3-2000 ORAL) Take by mouth once daily.       MULTIVITAMIN ORAL Take by mouth once daily at 6am.      olmesartan (BENICAR) 20 MG tablet Take 1 tablet (20 mg total) by mouth once daily. 90 tablet 3    sumatriptan (IMITREX) 50 MG tablet Take 50 mg by mouth as needed for Migraine.      THYROID ORAL Take by mouth once daily at 6am.      [DISCONTINUED] olmesartan (BENICAR) 40 MG tablet Take 1 tablet (40 mg total) by mouth once daily. 90 tablet 3       Past Medical History:   Diagnosis Date    Allergy     Breast cancer 2009    left    Cancer     breast    Hypertension     Invasive ductal carcinoma of breast     Osteoporosis        Family History   Problem Relation Age of Onset    Heart disease Mother     COPD Mother     Osteoporosis Mother     Heart disease Father     No Known Problems Sister     Hypothyroidism Daughter     No Known Problems Son     No Known Problems Maternal Grandmother     No Known Problems Maternal Grandfather     Breast cancer Paternal Grandmother     No Known Problems Paternal Grandfather     Breast cancer Sister         reports that she has never smoked. She has never used smokeless tobacco. She reports that she drinks alcohol. She reports that she does not use drugs.    Review of Systems   Constitutional: Negative for activity change and unexpected weight change.   HENT: Negative for  hearing loss, rhinorrhea and trouble swallowing.    Eyes: Negative for discharge and visual disturbance.   Respiratory: Negative for chest tightness and wheezing.    Cardiovascular: Negative for chest pain and palpitations.   Gastrointestinal: Negative for blood in stool, constipation, diarrhea and vomiting.   Endocrine: Negative for polydipsia and polyuria.   Genitourinary: Negative for difficulty urinating, dysuria, hematuria and menstrual problem.   Musculoskeletal: Negative for arthralgias, joint swelling and neck pain.   Neurological: Negative for weakness and headaches.   Psychiatric/Behavioral: Negative for confusion and dysphoric mood.       Objective:     Vitals:    04/26/18 0731   BP: 106/78   Pulse: 70   Resp: 16   Temp: 97.9 °F (36.6 °C)        Physical Exam   Constitutional: She is oriented to person, place, and time. She appears well-developed.   HENT:   Head: Normocephalic and atraumatic.   Right Ear: External ear normal. No foreign bodies. Tympanic membrane is not injected. No middle ear effusion.   Left Ear: External ear normal. No foreign bodies.  No middle ear effusion.   Mouth/Throat: Oropharynx is clear and moist. No oral lesions. No dental abscesses.   Eyes: Conjunctivae and EOM are normal. Pupils are equal, round, and reactive to light. Right eye exhibits no discharge. Left eye exhibits no discharge.   Neck: No tracheal deviation present. No thyromegaly present.   Cardiovascular: Normal rate, regular rhythm and normal heart sounds.  Exam reveals no gallop and no friction rub.    No murmur heard.  Pulmonary/Chest: Effort normal and breath sounds normal. No respiratory distress. She has no wheezes. She has no rales.   Abdominal: Soft. She exhibits no distension and no mass. There is no tenderness. There is no rebound and no guarding.   Lymphadenopathy:     She has no cervical adenopathy.   Neurological: She is alert and oriented to person, place, and time.   Skin: Skin is warm and dry.    Psychiatric: She has a normal mood and affect.   Vitals reviewed.      Assessment:       1. Annual physical exam    2. Hypertension, uncontrolled    3. Osteopenia, unspecified location        Plan:       Kandace was seen today for annual exam and labs only.    Diagnoses and all orders for this visit:    Annual physical exam  -     Hepatitis C antibody; Future  -     CBC auto differential; Future  -     Vitamin D; Future  Counseled on age appropriate medical preventative services, including age appropriate cancer screenings, over all nutritional health, need for a consistent exercise regimen and an over all push towards maintaining a vigorous and active lifestyle.      Counseled on age appropriate vaccines and discussed upcoming health care needs based on age/gender.  Spent time with patient counseling on need for a good patient/doctor relationship moving forward.      Hypertension, uncontrolled  -     olmesartan (BENICAR) 20 MG tablet; Take 1 tablet (20 mg total) by mouth once daily.    Osteopenia, unspecified location  -     ibandronate (BONIVA) 150 mg tablet; Take 1 tablet (150 mg total) by mouth every 30 days.  4/5 years completed.     Other orders  -     Cancel: TSH; Future            Follow-up for Annual Physical.        Pt verbalized understanding and agreed with our plan.

## 2018-05-25 ENCOUNTER — PATIENT MESSAGE (OUTPATIENT)
Dept: FAMILY MEDICINE | Facility: CLINIC | Age: 58
End: 2018-05-25

## 2018-05-25 ENCOUNTER — TELEPHONE (OUTPATIENT)
Dept: FAMILY MEDICINE | Facility: CLINIC | Age: 58
End: 2018-05-25

## 2018-05-25 NOTE — TELEPHONE ENCOUNTER
----- Message from Leonor Aiken sent at 5/25/2018  2:00 PM CDT -----  Contact: Self  Pt states she's returning a call. 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

## 2018-07-05 ENCOUNTER — OFFICE VISIT (OUTPATIENT)
Dept: FAMILY MEDICINE | Facility: CLINIC | Age: 58
End: 2018-07-05
Payer: COMMERCIAL

## 2018-07-05 VITALS
RESPIRATION RATE: 20 BRPM | DIASTOLIC BLOOD PRESSURE: 90 MMHG | SYSTOLIC BLOOD PRESSURE: 142 MMHG | OXYGEN SATURATION: 97 % | WEIGHT: 110.88 LBS | HEIGHT: 61 IN | BODY MASS INDEX: 20.93 KG/M2 | HEART RATE: 73 BPM | TEMPERATURE: 98 F

## 2018-07-05 DIAGNOSIS — I10 HYPERTENSION, UNCONTROLLED: Primary | ICD-10-CM

## 2018-07-05 DIAGNOSIS — T50.905A MEDICATION SIDE EFFECT, INITIAL ENCOUNTER: ICD-10-CM

## 2018-07-05 PROCEDURE — 3080F DIAST BP >= 90 MM HG: CPT | Mod: CPTII,S$GLB,, | Performed by: FAMILY MEDICINE

## 2018-07-05 PROCEDURE — 99214 OFFICE O/P EST MOD 30 MIN: CPT | Mod: S$GLB,,, | Performed by: FAMILY MEDICINE

## 2018-07-05 PROCEDURE — 3008F BODY MASS INDEX DOCD: CPT | Mod: CPTII,S$GLB,, | Performed by: FAMILY MEDICINE

## 2018-07-05 PROCEDURE — 99999 PR PBB SHADOW E&M-EST. PATIENT-LVL III: CPT | Mod: PBBFAC,,, | Performed by: FAMILY MEDICINE

## 2018-07-05 PROCEDURE — 3077F SYST BP >= 140 MM HG: CPT | Mod: CPTII,S$GLB,, | Performed by: FAMILY MEDICINE

## 2018-07-05 RX ORDER — SPIRONOLACTONE 50 MG/1
50 TABLET, FILM COATED ORAL DAILY
Qty: 30 TABLET | Refills: 2 | Status: SHIPPED | OUTPATIENT
Start: 2018-07-05

## 2018-07-05 NOTE — PROGRESS NOTES
Subjective:       Patient ID: Kandace Mcdonald is a 58 y.o. female.    Chief Complaint: Hypertension (medication managment )    HPI    HTN F/u:    Adherence with meds? No  Home BP range: 140/90 int at home as well.   Side effects: No  Following a low salt diet ? YES  Current exercise? Yes  Need of refills? No  Recent changes in blood pressure medication: Yes changed last visit.   Patient has been in pain or taking decongestants/anti-inflammatory/OCP/SSRI medication: no  Patient has other symptoms (headache, weakness,  blurry vision, chest pain, palpitations, SOB): no    Recap of side effects:  hctz-dry mouth    Amlodipine-hair loss    Olmesartan-hair loss and fatigue    Lisinopril - cough          Outpatient Prescriptions Marked as Taking for the 7/5/18 encounter (Office Visit) with Sandeep Grady MD   Medication Sig Dispense Refill    ibandronate (BONIVA) 150 mg tablet Take 1 tablet (150 mg total) by mouth every 30 days. 3 tablet 4    MULTIVITAMIN ORAL Take by mouth once daily at 6am.      sumatriptan (IMITREX) 50 MG tablet Take 50 mg by mouth as needed for Migraine.      THYROID ORAL Take by mouth once daily at 6am.         Past Medical History:   Diagnosis Date    Allergy     Breast cancer 2009    left    Cancer     breast    Hypertension     Invasive ductal carcinoma of breast     Osteoporosis        Family History   Problem Relation Age of Onset    Heart disease Mother     COPD Mother     Osteoporosis Mother     Heart disease Father     No Known Problems Sister     Hypothyroidism Daughter     No Known Problems Son     No Known Problems Maternal Grandmother     No Known Problems Maternal Grandfather     Breast cancer Paternal Grandmother     No Known Problems Paternal Grandfather     Breast cancer Sister         reports that she has never smoked. She has never used smokeless tobacco. She reports that she drinks alcohol. She reports that she does not use drugs.    Review of Systems    Respiratory: Negative for shortness of breath.    Cardiovascular: Negative for chest pain and palpitations.   Musculoskeletal: Negative for neck pain.   Neurological: Negative for headaches.       Objective:     Vitals:    07/05/18 1518   BP: (!) 142/90   Pulse: 73   Resp: 20   Temp: 98 °F (36.7 °C)        Physical Exam   Constitutional: She appears well-developed. No distress.   HENT:   Head: Normocephalic and atraumatic.   Eyes: Conjunctivae are normal. No scleral icterus.   Pulmonary/Chest: Effort normal.   Neurological: She is alert.   Skin: She is not diaphoretic.   Psychiatric: She has a normal mood and affect. Her behavior is normal.   Vitals reviewed.      Assessment:       1. Hypertension, uncontrolled    2. Medication side effect, initial encounter        Plan:       Kandace was seen today for hypertension.    Diagnoses and all orders for this visit:    Hypertension, uncontrolled  -     spironolactone (ALDACTONE) 50 MG tablet; Take 1 tablet (50 mg total) by mouth once daily.  Patient's blood pressure continues to be elevated despite near maximal lifestyle interventions by the patient. We have also had a very typical finding a medication that she can tolerate the long-term it does not create hair loss or excessive xerostomia    nifedipine if spironolactone doesnt work     Medication side effect, initial encounter  alopecia with olmesrtan    Recap of side effects:  hctz-dry mouth    Amlodipine-hair loss    Olmesartan-hair loss and fatigue    Lisinopril - cough              Follow-up in about 3 months (around 10/5/2018) for Hypertension, 2 week nurse BP check.        Pt verbalized understanding and agreed with our plan.

## 2018-07-05 NOTE — PROGRESS NOTES
Health Maintenance      Zoster Vaccine      hx chickenpox ; inform pt can get vaccine at pharmacy.

## 2018-07-09 ENCOUNTER — PATIENT MESSAGE (OUTPATIENT)
Dept: FAMILY MEDICINE | Facility: CLINIC | Age: 58
End: 2018-07-09

## 2018-07-10 ENCOUNTER — PATIENT MESSAGE (OUTPATIENT)
Dept: FAMILY MEDICINE | Facility: CLINIC | Age: 58
End: 2018-07-10

## 2018-07-10 DIAGNOSIS — I10 UNCOMPLICATED HYPERTENSION: Primary | ICD-10-CM

## 2018-07-10 NOTE — TELEPHONE ENCOUNTER
Spoke with Favian in pharmacy. Spironolactone 50 mg is out of stock and would like to know if it can be changed to Spironolactone 25 mg and patient takes 2. Per  ok to change to Spironolactone 25 mg and change directions to patient to take 2 tablets once daily. Verbalized understanding. Verbal order read back.

## 2018-07-19 RX ORDER — NIFEDIPINE 30 MG/1
30 TABLET, EXTENDED RELEASE ORAL DAILY
Qty: 90 TABLET | Refills: 2 | Status: SHIPPED | OUTPATIENT
Start: 2018-07-19

## 2018-07-19 NOTE — TELEPHONE ENCOUNTER
Please have patient decrease her spirolactone to 25 mg daily and add nifedipine 30mg which I have sent to her pharmacy. These can be taken together in the am.     Thanks

## 2018-07-19 NOTE — TELEPHONE ENCOUNTER
Called pt and informed of below changes; verbalized understanding; BP check rescheduled to next thursday

## 2018-07-22 ENCOUNTER — PATIENT MESSAGE (OUTPATIENT)
Dept: FAMILY MEDICINE | Facility: CLINIC | Age: 58
End: 2018-07-22